# Patient Record
Sex: MALE | Race: WHITE | NOT HISPANIC OR LATINO | Employment: OTHER | URBAN - METROPOLITAN AREA
[De-identification: names, ages, dates, MRNs, and addresses within clinical notes are randomized per-mention and may not be internally consistent; named-entity substitution may affect disease eponyms.]

---

## 2020-04-23 ENCOUNTER — HOSPITAL ENCOUNTER (EMERGENCY)
Facility: HOSPITAL | Age: 50
Discharge: HOME | End: 2020-04-23
Attending: EMERGENCY MEDICINE | Admitting: EMERGENCY MEDICINE

## 2020-04-23 VITALS
WEIGHT: 145 LBS | BODY MASS INDEX: 20.76 KG/M2 | HEART RATE: 104 BPM | RESPIRATION RATE: 16 BRPM | HEIGHT: 70 IN | OXYGEN SATURATION: 99 % | TEMPERATURE: 97.5 F | SYSTOLIC BLOOD PRESSURE: 140 MMHG | DIASTOLIC BLOOD PRESSURE: 91 MMHG

## 2020-04-23 DIAGNOSIS — K29.50 CHRONIC GASTRITIS WITHOUT BLEEDING, UNSPECIFIED GASTRITIS TYPE: Primary | ICD-10-CM

## 2020-04-23 LAB
ALBUMIN SERPL-MCNC: 3.7 G/DL (ref 3.5–5.2)
ALBUMIN/GLOB SERPL: 1.2 G/DL
ALP SERPL-CCNC: 94 U/L (ref 39–117)
ALT SERPL W P-5'-P-CCNC: 60 U/L (ref 1–41)
ANION GAP SERPL CALCULATED.3IONS-SCNC: 11.8 MMOL/L (ref 5–15)
AST SERPL-CCNC: 46 U/L (ref 1–40)
BASOPHILS # BLD AUTO: 0.05 10*3/MM3 (ref 0–0.2)
BASOPHILS NFR BLD AUTO: 0.5 % (ref 0–1.5)
BILIRUB SERPL-MCNC: 0.3 MG/DL (ref 0.2–1.2)
BUN BLD-MCNC: 12 MG/DL (ref 6–20)
BUN/CREAT SERPL: 15.6 (ref 7–25)
CALCIUM SPEC-SCNC: 8.6 MG/DL (ref 8.6–10.5)
CHLORIDE SERPL-SCNC: 103 MMOL/L (ref 98–107)
CO2 SERPL-SCNC: 24.2 MMOL/L (ref 22–29)
CREAT BLD-MCNC: 0.77 MG/DL (ref 0.76–1.27)
DEPRECATED RDW RBC AUTO: 42.6 FL (ref 37–54)
EOSINOPHIL # BLD AUTO: 0.11 10*3/MM3 (ref 0–0.4)
EOSINOPHIL NFR BLD AUTO: 1.1 % (ref 0.3–6.2)
ERYTHROCYTE [DISTWIDTH] IN BLOOD BY AUTOMATED COUNT: 12.8 % (ref 12.3–15.4)
GFR SERPL CREATININE-BSD FRML MDRD: 108 ML/MIN/1.73
GLOBULIN UR ELPH-MCNC: 3 GM/DL
GLUCOSE BLD-MCNC: 123 MG/DL (ref 65–99)
HCT VFR BLD AUTO: 39.7 % (ref 37.5–51)
HGB BLD-MCNC: 13.4 G/DL (ref 13–17.7)
IMM GRANULOCYTES # BLD AUTO: 0.06 10*3/MM3 (ref 0–0.05)
IMM GRANULOCYTES NFR BLD AUTO: 0.6 % (ref 0–0.5)
LIPASE SERPL-CCNC: 16 U/L (ref 13–60)
LYMPHOCYTES # BLD AUTO: 1.78 10*3/MM3 (ref 0.7–3.1)
LYMPHOCYTES NFR BLD AUTO: 17.7 % (ref 19.6–45.3)
MCH RBC QN AUTO: 30.6 PG (ref 26.6–33)
MCHC RBC AUTO-ENTMCNC: 33.8 G/DL (ref 31.5–35.7)
MCV RBC AUTO: 90.6 FL (ref 79–97)
MONOCYTES # BLD AUTO: 0.55 10*3/MM3 (ref 0.1–0.9)
MONOCYTES NFR BLD AUTO: 5.5 % (ref 5–12)
NEUTROPHILS # BLD AUTO: 7.51 10*3/MM3 (ref 1.7–7)
NEUTROPHILS NFR BLD AUTO: 74.6 % (ref 42.7–76)
NRBC BLD AUTO-RTO: 0 /100 WBC (ref 0–0.2)
PLATELET # BLD AUTO: 225 10*3/MM3 (ref 140–450)
PMV BLD AUTO: 10.3 FL (ref 6–12)
POTASSIUM BLD-SCNC: 4.1 MMOL/L (ref 3.5–5.2)
PROT SERPL-MCNC: 6.7 G/DL (ref 6–8.5)
RBC # BLD AUTO: 4.38 10*6/MM3 (ref 4.14–5.8)
SODIUM BLD-SCNC: 139 MMOL/L (ref 136–145)
WBC NRBC COR # BLD: 10.06 10*3/MM3 (ref 3.4–10.8)

## 2020-04-23 PROCEDURE — 96374 THER/PROPH/DIAG INJ IV PUSH: CPT

## 2020-04-23 PROCEDURE — 83690 ASSAY OF LIPASE: CPT | Performed by: EMERGENCY MEDICINE

## 2020-04-23 PROCEDURE — 25010000002 ONDANSETRON PER 1 MG: Performed by: EMERGENCY MEDICINE

## 2020-04-23 PROCEDURE — 85025 COMPLETE CBC W/AUTO DIFF WBC: CPT | Performed by: EMERGENCY MEDICINE

## 2020-04-23 PROCEDURE — 99283 EMERGENCY DEPT VISIT LOW MDM: CPT

## 2020-04-23 PROCEDURE — 80053 COMPREHEN METABOLIC PANEL: CPT | Performed by: EMERGENCY MEDICINE

## 2020-04-23 PROCEDURE — 96375 TX/PRO/DX INJ NEW DRUG ADDON: CPT

## 2020-04-23 RX ORDER — LIDOCAINE HYDROCHLORIDE 20 MG/ML
15 SOLUTION OROPHARYNGEAL ONCE
Status: COMPLETED | OUTPATIENT
Start: 2020-04-23 | End: 2020-04-23

## 2020-04-23 RX ORDER — FAMOTIDINE 10 MG/ML
20 INJECTION, SOLUTION INTRAVENOUS ONCE
Status: COMPLETED | OUTPATIENT
Start: 2020-04-23 | End: 2020-04-23

## 2020-04-23 RX ORDER — ALUMINA, MAGNESIA, AND SIMETHICONE 2400; 2400; 240 MG/30ML; MG/30ML; MG/30ML
15 SUSPENSION ORAL ONCE
Status: COMPLETED | OUTPATIENT
Start: 2020-04-23 | End: 2020-04-23

## 2020-04-23 RX ORDER — SODIUM CHLORIDE 0.9 % (FLUSH) 0.9 %
10 SYRINGE (ML) INJECTION AS NEEDED
Status: DISCONTINUED | OUTPATIENT
Start: 2020-04-23 | End: 2020-04-23 | Stop reason: HOSPADM

## 2020-04-23 RX ORDER — ONDANSETRON 2 MG/ML
4 INJECTION INTRAMUSCULAR; INTRAVENOUS ONCE
Status: COMPLETED | OUTPATIENT
Start: 2020-04-23 | End: 2020-04-23

## 2020-04-23 RX ORDER — SUCRALFATE ORAL 1 G/10ML
1 SUSPENSION ORAL 4 TIMES DAILY
Qty: 500 ML | Refills: 0 | Status: SHIPPED | OUTPATIENT
Start: 2020-04-23

## 2020-04-23 RX ADMIN — ONDANSETRON 4 MG: 2 INJECTION INTRAMUSCULAR; INTRAVENOUS at 11:51

## 2020-04-23 RX ADMIN — SODIUM CHLORIDE 1000 ML: 9 INJECTION, SOLUTION INTRAVENOUS at 11:50

## 2020-04-23 RX ADMIN — LIDOCAINE HYDROCHLORIDE 15 ML: 20 SOLUTION ORAL; TOPICAL at 11:51

## 2020-04-23 RX ADMIN — ALUMINUM HYDROXIDE, MAGNESIUM HYDROXIDE, AND DIMETHICONE 15 ML: 400; 400; 40 SUSPENSION ORAL at 11:51

## 2020-04-23 RX ADMIN — FAMOTIDINE 20 MG: 10 INJECTION INTRAVENOUS at 11:51

## 2020-04-23 NOTE — ED NOTES
Pt reports history of GERD, stated that it has been so bad lately that he cannot keep anything down and has lost approx 20 lbs in 2 mo.  Pt vs stable, hr 100, O2 100 RA, 149/95 Bp, 18 RR   In NAD, skin warm and dry.      Anali Murphy, RN  04/23/20 7994

## 2020-04-23 NOTE — ED PROVIDER NOTES
EMERGENCY DEPARTMENT ENCOUNTER    Room Number:  10/10  Date of encounter:  4/23/2020  PCP: Provider, No Known  Historian: Patient      HPI:  Chief Complaint: Epigastric pain  A complete HPI/ROS/PMH/PSH/SH/FH are unobtainable due to: N/A    Context: Evert Diehl is a 49 y.o. male who presents to the ED c/o several months of burning epigastric pain.  It is been worse the past 2 weeks.  He states he has a history of bleeding ulcers and has been on Protonix in the past but has not taken it for several months.  He has been getting through on over-the-counter Tums.  Pain is described as burning, it is exacerbated by food.  He does occasionally have vomiting as well-has been vomiting more lately.  He notices no blood or coffee ground appearance to the emesis.  Bowel habits have been normal.  He has anorexia-he reports a 20 pound weight loss over the past several months.  He does not have a primary care doctor and has not sought medical attention for this problem.  He is status post appendectomy and cholecystectomy in the remote past.      PAST MEDICAL HISTORY  Active Ambulatory Problems     Diagnosis Date Noted   • No Active Ambulatory Problems     Resolved Ambulatory Problems     Diagnosis Date Noted   • No Resolved Ambulatory Problems     No Additional Past Medical History         PAST SURGICAL HISTORY  History reviewed. No pertinent surgical history.      FAMILY HISTORY  History reviewed. No pertinent family history.      SOCIAL HISTORY  Social History     Socioeconomic History   • Marital status: Single     Spouse name: Not on file   • Number of children: Not on file   • Years of education: Not on file   • Highest education level: Not on file   Tobacco Use   • Smoking status: Current Every Day Smoker     Packs/day: 1.00     Years: 30.00     Pack years: 30.00   • Smokeless tobacco: Never Used   Substance and Sexual Activity   • Drug use: Never   • Sexual activity: Defer         ALLERGIES  Patient has no known  allergies.        REVIEW OF SYSTEMS  Review of Systems   Constitutional: Negative for activity change, appetite change and fever.   HENT: Negative for congestion and sore throat.    Eyes: Negative.    Respiratory: Negative for cough and shortness of breath.    Cardiovascular: Negative for chest pain and leg swelling.   Gastrointestinal: Positive for abdominal pain, nausea and vomiting. Negative for blood in stool and diarrhea.   Endocrine: Negative.    Genitourinary: Negative for decreased urine volume and dysuria.   Musculoskeletal: Negative for neck pain.   Skin: Negative for rash and wound.   Allergic/Immunologic: Negative.    Neurological: Negative for weakness, numbness and headaches.   Hematological: Negative.    Psychiatric/Behavioral: Negative.    All other systems reviewed and are negative.       All systems reviewed and negative except for those discussed in HPI.       PHYSICAL EXAM    I have reviewed the triage vital signs and nursing notes.    ED Triage Vitals [04/23/20 1052]   Temp Heart Rate Resp BP SpO2   97.5 °F (36.4 °C) 101 16 -- 97 %      Temp src Heart Rate Source Patient Position BP Location FiO2 (%)   -- -- -- -- --       Physical Exam   Constitutional: Pt. is oriented to person, place, and time and well-developed, well-nourished, and in no distress. No distress.   HENT: Normocephalic and atraumatic,  EOM are normal. Pupils are equal, round, and reactive to light. Oropharynx moist/nonerythematous.  Neck: Normal range of motion. Neck supple. No JVD present. No tracheal deviation present. No thyromegaly present.   Cardiovascular: Normal rate, regular rhythm and normal heart sounds. Exam reveals no gallop and no friction rub.   No murmur heard.  Pulmonary/Chest: Effort normal and breath sounds normal. No stridor. No respiratory distress. No wheezes, no rales.   Abdominal: Soft. Bowel sounds are normal. No distension. There is minimal epigastric tenderness. There is no rebound and no guarding.    Musculoskeletal: Normal range of motion. No edema, tenderness or deformity.   Neurological: Pt. is alert and oriented to person, place, and time. Pt. has normal sensation and normal strength. No cranial nerve deficit. GCS score is 15.   Skin: Skin is warm and dry. No rash noted. Pt. is not diaphoretic. No erythema.   Psychiatric: Mood, affect and judgment normal.   Nursing note and vitals reviewed.        LAB RESULTS  Recent Results (from the past 24 hour(s))   Comprehensive Metabolic Panel    Collection Time: 04/23/20 11:38 AM   Result Value Ref Range    Glucose 123 (H) 65 - 99 mg/dL    BUN 12 6 - 20 mg/dL    Creatinine 0.77 0.76 - 1.27 mg/dL    Sodium 139 136 - 145 mmol/L    Potassium 4.1 3.5 - 5.2 mmol/L    Chloride 103 98 - 107 mmol/L    CO2 24.2 22.0 - 29.0 mmol/L    Calcium 8.6 8.6 - 10.5 mg/dL    Total Protein 6.7 6.0 - 8.5 g/dL    Albumin 3.70 3.50 - 5.20 g/dL    ALT (SGPT) 60 (H) 1 - 41 U/L    AST (SGOT) 46 (H) 1 - 40 U/L    Alkaline Phosphatase 94 39 - 117 U/L    Total Bilirubin 0.3 0.2 - 1.2 mg/dL    eGFR Non African Amer 108 >60 mL/min/1.73    Globulin 3.0 gm/dL    A/G Ratio 1.2 g/dL    BUN/Creatinine Ratio 15.6 7.0 - 25.0    Anion Gap 11.8 5.0 - 15.0 mmol/L   Lipase    Collection Time: 04/23/20 11:38 AM   Result Value Ref Range    Lipase 16 13 - 60 U/L   CBC Auto Differential    Collection Time: 04/23/20 11:38 AM   Result Value Ref Range    WBC 10.06 3.40 - 10.80 10*3/mm3    RBC 4.38 4.14 - 5.80 10*6/mm3    Hemoglobin 13.4 13.0 - 17.7 g/dL    Hematocrit 39.7 37.5 - 51.0 %    MCV 90.6 79.0 - 97.0 fL    MCH 30.6 26.6 - 33.0 pg    MCHC 33.8 31.5 - 35.7 g/dL    RDW 12.8 12.3 - 15.4 %    RDW-SD 42.6 37.0 - 54.0 fl    MPV 10.3 6.0 - 12.0 fL    Platelets 225 140 - 450 10*3/mm3    Neutrophil % 74.6 42.7 - 76.0 %    Lymphocyte % 17.7 (L) 19.6 - 45.3 %    Monocyte % 5.5 5.0 - 12.0 %    Eosinophil % 1.1 0.3 - 6.2 %    Basophil % 0.5 0.0 - 1.5 %    Immature Grans % 0.6 (H) 0.0 - 0.5 %    Neutrophils, Absolute  7.51 (H) 1.70 - 7.00 10*3/mm3    Lymphocytes, Absolute 1.78 0.70 - 3.10 10*3/mm3    Monocytes, Absolute 0.55 0.10 - 0.90 10*3/mm3    Eosinophils, Absolute 0.11 0.00 - 0.40 10*3/mm3    Basophils, Absolute 0.05 0.00 - 0.20 10*3/mm3    Immature Grans, Absolute 0.06 (H) 0.00 - 0.05 10*3/mm3    nRBC 0.0 0.0 - 0.2 /100 WBC       Ordered the above labs and independently reviewed the results.        RADIOLOGY  No Radiology Exams Resulted Within Past 24 Hours    I ordered the above noted radiological studies. Reviewed by me and discussed with radiologist.  See dictation for official radiology interpretation.      PROCEDURES    Procedures      MEDICATIONS GIVEN IN ER    Medications   sodium chloride 0.9 % flush 10 mL (has no administration in time range)   famotidine (PEPCID) injection 20 mg (20 mg Intravenous Given 4/23/20 1151)   aluminum-magnesium hydroxide-simethicone (MAALOX MAX) 400-400-40 MG/5ML suspension 15 mL (15 mL Oral Given 4/23/20 1151)   Lidocaine Viscous HCl (XYLOCAINE) 2 % mouth solution 15 mL (15 mL Mouth/Throat Given 4/23/20 1151)   sodium chloride 0.9 % bolus 1,000 mL (0 mL Intravenous Stopped 4/23/20 1232)   ondansetron (ZOFRAN) injection 4 mg (4 mg Intravenous Given 4/23/20 1151)         PROGRESS, DATA ANALYSIS, CONSULTS, AND MEDICAL DECISION MAKING    Any/all labs have been independently reviewed by me.  Any/all radiology studies have been reviewed by me and discussed with radiologist dictating the report.   EKG's independently viewed and interpreted by me.  Discussion below represents my analysis of pertinent findings related to patient's condition, differential diagnosis, treatment plan and final disposition.      ED Course as of Apr 23 1423   Thu Apr 23, 2020   1220 CBC and CMP are unremarkable with exception of mildly elevated LFTs.  He reports great improvement in his pain after IV Pepcid GI cocktail.  He was to follow-up with Dr. Black as an outpatient.    Discussed all lab and imaging with the  patient.  The patient's abdominal exam has improved.  I explained that the patient should return to the emergency department should the pain recur or for any new symptoms (fever, blood in emesis/stool).  I also advised the patient to follow up with their PMD for further evaluation.  My clinical suspicion for serious intra-abdominal pathology is low, and the patient can be safely discharged home for outpatient follow up.      [WC]      ED Course User Index  [WC] Neil Jara MD       AS OF 14:23 VITALS:    BP - 140/91  HR - 104  TEMP - 97.5 °F (36.4 °C)  02 SATS - 99%        DIAGNOSIS  Final diagnoses:   Chronic gastritis without bleeding, unspecified gastritis type         DISPOSITION  Discharged           Neil Jara MD  04/23/20 2363

## 2020-04-23 NOTE — DISCHARGE INSTRUCTIONS
Start over-the-counter Prilosec or Pepcid as we discussed.  Start Carafate as prescribed.  Return to the emergency department for any problems, especially if you develop any black or bloody stools or vomit.

## 2020-04-23 NOTE — ED TRIAGE NOTES
Patient presents to er via private vehicle from home.  Patient is reporting abdominal pain for several months worse over the past two weeks.  States he has vomited x10 within the past 24 hours.  Patient was placed in face mask during first look triage.  Patient was wearing a face mask throughout encounter.  I wore personal protective equipment throughout the encounter.  Hand hygiene was performed before and after patient encounter.

## 2020-04-28 ENCOUNTER — OFFICE VISIT (OUTPATIENT)
Dept: GASTROENTEROLOGY | Facility: CLINIC | Age: 50
End: 2020-04-28

## 2020-04-28 ENCOUNTER — HOSPITAL ENCOUNTER (OUTPATIENT)
Dept: CT IMAGING | Facility: HOSPITAL | Age: 50
Discharge: HOME | End: 2020-04-28
Admitting: INTERNAL MEDICINE

## 2020-04-28 VITALS
HEIGHT: 70 IN | HEART RATE: 125 BPM | BODY MASS INDEX: 20.76 KG/M2 | WEIGHT: 145 LBS | OXYGEN SATURATION: 98 % | DIASTOLIC BLOOD PRESSURE: 88 MMHG | SYSTOLIC BLOOD PRESSURE: 136 MMHG | TEMPERATURE: 97.5 F

## 2020-04-28 DIAGNOSIS — K25.9 MULTIPLE GASTRIC ULCERS: ICD-10-CM

## 2020-04-28 DIAGNOSIS — R10.13 EPIGASTRIC PAIN: ICD-10-CM

## 2020-04-28 DIAGNOSIS — R63.4 WEIGHT LOSS, ABNORMAL: ICD-10-CM

## 2020-04-28 DIAGNOSIS — R10.13 EPIGASTRIC PAIN: Primary | ICD-10-CM

## 2020-04-28 PROCEDURE — 25010000002 IOPAMIDOL 61 % SOLUTION: Performed by: INTERNAL MEDICINE

## 2020-04-28 PROCEDURE — 99204 OFFICE O/P NEW MOD 45 MIN: CPT | Performed by: INTERNAL MEDICINE

## 2020-04-28 PROCEDURE — 74177 CT ABD & PELVIS W/CONTRAST: CPT

## 2020-04-28 PROCEDURE — 0 DIATRIZOATE MEGLUMINE & SODIUM PER 1 ML: Performed by: INTERNAL MEDICINE

## 2020-04-28 RX ORDER — SUCRALFATE 1 G/1
1 TABLET ORAL 2 TIMES DAILY
Qty: 60 TABLET | Refills: 1 | Status: SHIPPED | OUTPATIENT
Start: 2020-04-28

## 2020-04-28 RX ORDER — ONDANSETRON 4 MG/1
TABLET, ORALLY DISINTEGRATING ORAL
Qty: 20 TABLET | Refills: 1 | Status: SHIPPED | OUTPATIENT
Start: 2020-04-28

## 2020-04-28 RX ORDER — PANTOPRAZOLE SODIUM 40 MG/1
40 TABLET, DELAYED RELEASE ORAL DAILY
Qty: 30 TABLET | Refills: 5 | Status: SHIPPED | OUTPATIENT
Start: 2020-04-28

## 2020-04-28 RX ADMIN — IOPAMIDOL 85 ML: 612 INJECTION, SOLUTION INTRAVENOUS at 14:05

## 2020-04-28 RX ADMIN — DIATRIZOATE MEGLUMINE AND DIATRIZOATE SODIUM 30 ML: 600; 100 SOLUTION ORAL; RECTAL at 13:05

## 2020-04-28 NOTE — PATIENT INSTRUCTIONS
1.  We are scheduling another set of blood work for you to get done today.  2.  CT scan of the abdomen pelvis to be done today or tomorrow.  3.  If your symptoms worsen while we are trying to get these work-up completed please go back to the emergency room immediately or contact us  4.  Prescriptions sent into the pharmacy for refills of Carafate, and new prescriptions for Protonix and antinausea medication

## 2020-04-28 NOTE — PROGRESS NOTES
No chief complaint on file.  nausea and vomiting and abdominal pain and weight loss.        HPI  49 year old male with PMH of ulcers presented to ER with nausea inability to tolerate po and vomiting.  Diagnosed with ulcers and gastritis and started on H2 blocker and carafate--    He presented to the emergency room on 4/23/2020.  He described several months of burning in the epigastric area.  It had worsened over the previous 2 weeks.  Medications used at home included Tums although the patient states that he had been on a PPI in the past due to a diagnosis of ulcers.  The pain is located in the epigastric region it is described as burning some vomiting no hematemesis but associated poor appetite anorexia and weight loss.  He thinks he has lost close to 20 pounds    In er hgb was 13.4 with a white count of 10.06  Slightly elevated LFT with a ALT 60 and AST 46 and increased glucose at 123. Lipase normal at 16.    Since being discharged from the hospital he reports he has not improved.  He reports significant abdominal pain as well as nausea and vomiting.  He states that on a good day he only vomits 3 times.  Otherwise, he can vomit more times than that.  He has not had any hematemesis.  The pain is described in the epigastric area and feels like reflux and ulcers to him.  He is status post cholecystectomy.  He has never had EGD and colonoscopy and states he was diagnosed with ulcers based on an NG tube placed on his nose with blood returning but this was 5 years ago.  He has had constipation associated with this and no diarrhea no bright red blood per rectum no fever that he is aware of    Review of Systems   Constitutional: Positive for activity change, appetite change and unexpected weight change.   HENT: Negative.    Eyes: Negative.    Respiratory: Negative.    Cardiovascular: Negative.    Gastrointestinal: Positive for abdominal pain, constipation and vomiting.   Endocrine: Negative for cold intolerance.    Genitourinary: Negative for difficulty urinating.   Musculoskeletal: Negative for arthralgias.   Neurological: Positive for weakness. Negative for dizziness.   Hematological: Negative for adenopathy.   Psychiatric/Behavioral: Negative.         I have reviewed and confirmed the accuracy of the HPI and ROS as documented by the APRN Brian Black MD     Problem List:  There is no problem list on file for this patient.      Medical History:  No past medical history on file.     Social History:    Social History     Socioeconomic History   • Marital status: Single     Spouse name: Not on file   • Number of children: Not on file   • Years of education: Not on file   • Highest education level: Not on file   Tobacco Use   • Smoking status: Current Every Day Smoker     Packs/day: 1.00     Years: 30.00     Pack years: 30.00   • Smokeless tobacco: Never Used   Substance and Sexual Activity   • Drug use: Never   • Sexual activity: Defer       Family History: No family history on file.    Surgical History: No past surgical history on file.      Current Outpatient Medications:   •  sucralfate (CARAFATE) 1 GM/10ML suspension, Take 10 mL by mouth 4 (Four) Times a Day., Disp: 500 mL, Rfl: 0    Allergies: No Known Allergies     The following portions of the patient's history were reviewed and updated as appropriate: allergies, current medications, past family history, past medical history, past social history, past surgical history and problem list.    There were no vitals filed for this visit.  There were no vitals filed for this visit.  There is no height or weight on file to calculate BMI.      PHYSICAL EXAM:  Physical Exam   Constitutional: He is oriented to person, place, and time.   The patient appears thin   HENT:   Head: Atraumatic.   Nose: Nose normal.   Eyes: Pupils are equal, round, and reactive to light.   Neck: Neck supple.   Cardiovascular: Normal rate.   Pulmonary/Chest: Effort normal.   Abdominal: There is  tenderness. There is no rebound.   There is tenderness in the epigastric region that is reproducible.  It is not severe he does not have focal tenderness or rebound but the general epigastric area is tender   Musculoskeletal: Normal range of motion.   Neurological: He is alert and oriented to person, place, and time.   Skin: Skin is warm.   Psychiatric: He has a normal mood and affect.         Assessment/ Plan  Diagnoses and all orders for this visit:    Epigastric pain    Multiple gastric ulcers    Weight loss, abnormal         No follow-ups on file.      Discussion:  I let the patient know that I was worried about his presentation today.  Given his ongoing symptoms of abdominal pain weight loss nausea and vomiting I offered admission to the hospital.  He states that he does not want to take the time off of work to be in the hospital.  I explained to him that between his office visit today, and the work-up needed including imaging blood work and likely endoscopic evaluation that this time would be added up and he might actually lose less time at work if we admit him and get all of the work-up done expeditiously.  He was appreciative of that perspective and will think about letting me admit him but would like to try to get this done as an outpatient.  I let him know that that is acceptable only if the blood work and CT scan are performed urgently and he does not worsen and he call us immediately or go back to the emergency room if his symptoms worsen while we are awaiting the results and he expressed understanding of that.    Therefore we will order full set of blood work to recheck his CBC hemoglobin liver tests and white count and amylase and lipase.  He is status post a cholecystectomy and does not drink alcohol but does he did have some mild elevated LFTs at the emergency room.  He does smoke.  We will order a stat CT scan of the abdomen and pelvis and start him on a PPI as well as continue his Carafate and add  antinausea medications.  If his blood work is normal and his CT scan is normal and his symptoms improve then we can schedule his EGD with or without colonoscopy as appropriate.  If his blood work and CT scan are abnormal or his symptoms worsen this may require admission and more urgent endoscopic evaluation.  He does understand the plan.

## 2020-06-25 ENCOUNTER — TELEPHONE (OUTPATIENT)
Dept: GASTROENTEROLOGY | Facility: CLINIC | Age: 50
End: 2020-06-25

## 2020-07-10 ENCOUNTER — TELEPHONE (OUTPATIENT)
Dept: GASTROENTEROLOGY | Facility: CLINIC | Age: 50
End: 2020-07-10

## 2020-07-10 NOTE — TELEPHONE ENCOUNTER
----- Message from Brian Black MD sent at 4/30/2020  8:12 AM EDT -----  Can we check on these labs?    And ask pt if he did the enemas?,   and started the PPI?  And is he available next th if need be for scopes?

## 2020-07-10 NOTE — TELEPHONE ENCOUNTER
Several attempts were made to contact patient concerning the f/u questions from Dr. Black. Letter has been mailed as well but no response from patient. Nothing further needed at this time.    TS    04/28/2020.        [facility].            TS        05/04/2020        [facility].                    05/05/2020        [facility].            TS      Letter Mailed by Kelly Rodriguez for patient to contact office.